# Patient Record
Sex: MALE | Race: OTHER | NOT HISPANIC OR LATINO
[De-identification: names, ages, dates, MRNs, and addresses within clinical notes are randomized per-mention and may not be internally consistent; named-entity substitution may affect disease eponyms.]

---

## 2023-05-26 PROBLEM — Z00.00 ENCOUNTER FOR PREVENTIVE HEALTH EXAMINATION: Status: ACTIVE | Noted: 2023-05-26

## 2023-05-30 ENCOUNTER — APPOINTMENT (OUTPATIENT)
Dept: RHEUMATOLOGY | Facility: CLINIC | Age: 38
End: 2023-05-30
Payer: COMMERCIAL

## 2023-05-30 ENCOUNTER — NON-APPOINTMENT (OUTPATIENT)
Age: 38
End: 2023-05-30

## 2023-05-30 VITALS
BODY MASS INDEX: 38.08 KG/M2 | WEIGHT: 266 LBS | DIASTOLIC BLOOD PRESSURE: 76 MMHG | HEIGHT: 70 IN | HEART RATE: 83 BPM | OXYGEN SATURATION: 96 % | SYSTOLIC BLOOD PRESSURE: 114 MMHG

## 2023-05-30 DIAGNOSIS — Z78.9 OTHER SPECIFIED HEALTH STATUS: ICD-10-CM

## 2023-05-30 PROCEDURE — 99204 OFFICE O/P NEW MOD 45 MIN: CPT

## 2023-05-30 NOTE — PHYSICAL EXAM
[General Appearance - Alert] : alert [General Appearance - In No Acute Distress] : in no acute distress [Sclera] : the sclera and conjunctiva were normal [PERRL With Normal Accommodation] : pupils were equal in size, round, and reactive to light [Extraocular Movements] : extraocular movements were intact [Outer Ear] : the ears and nose were normal in appearance [Oropharynx] : the oropharynx was normal [Neck Appearance] : the appearance of the neck was normal [Neck Cervical Mass (___cm)] : no neck mass was observed [Thyroid Diffuse Enlargement] : the thyroid was not enlarged [Thyroid Nodule] : there were no palpable thyroid nodules [Exaggerated Use Of Accessory Muscles For Inspiration] : no accessory muscle use [Abdomen Soft] : soft [Abdomen Tenderness] : non-tender [Abnormal Walk] : normal gait [Nail Clubbing] : no clubbing  or cyanosis of the fingernails [Musculoskeletal - Swelling] : no joint swelling seen [Motor Tone] : muscle strength and tone were normal [Skin Color & Pigmentation] : normal skin color and pigmentation [Skin Turgor] : normal skin turgor [] : no rash [Oriented To Time, Place, And Person] : oriented to person, place, and time [Impaired Insight] : insight and judgment were intact [Affect] : the affect was normal

## 2023-05-30 NOTE — HISTORY OF PRESENT ILLNESS
[FreeTextEntry1] : Patient reports symptoms of fevers up 103F for almost 2 weeks every day, fatigue, aches in legs, arms, back ,neck, vomiting unable to keep food and lost weight, night sweats, discomfort under his R rib. He also felt R swollen glands and trouble swallowing. He felt spontaneous sweating during the day profusely then it was stop. He didn't take any medications. This past week his fevers improved it was 98F in the morning to 100F and he's starting to feel better. He's eating and drinking normally. \par \par +Chronic dry eyes and dry mouth and he gets mouth ulcers "every once in a while"\par +Chronic knee and hand pains he attributes to [place] a  [place]. Denies joint swelling and he has occasional lower back stiffness depending on what he did the day before. 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 \par \par Denies rash, photosensitivity, raynaud's, dysphagia, skin thickening, nasal ulcers, hair loss, sinus problems, nosebleeds, visual disturbances, +dry eyes, +dry mouth, history of VTE, history of serositis.\par \par No history of autoimmune disease in family\par \par Labs:\par \par AMMON 1:40\par CRP 22.6\par Lyme Ab screen 1.44\par Western blot IgG + 5 reactive bands \par [id number]  alkaline phosphatase 306\par

## 2023-05-30 NOTE — ASSESSMENT
[FreeTextEntry1] : Fevers, body aches, vomiting, positive AMMON 1:40\par -Patient has no clear signs or symptoms to suggest an autoimmune disease. He does have fevers, and his AMMON is below threshold of 1:80 for classifying SLE. He also has elevated liver enzymes and no muscle symptoms of weakness. His Lyme antibody screen was positive and was + for 5 IgG bands on western blot, and no IgM bands.\par -Check AMMON, dsDNA, Chou, RNP, C3 ,C4, RF, CCP, ANCA, CPK, Aldolase, parvovirus, lyme disease, ferritin, IgA, IgM, IgG, scleroderma, sjogrens, hepatitis B serologies\par -F/u 2 weeks

## 2023-06-01 ENCOUNTER — EMERGENCY (EMERGENCY)
Facility: HOSPITAL | Age: 38
LOS: 1 days | Discharge: HOME | End: 2023-06-01
Attending: EMERGENCY MEDICINE
Payer: COMMERCIAL

## 2023-06-01 VITALS
OXYGEN SATURATION: 99 % | TEMPERATURE: 99 F | DIASTOLIC BLOOD PRESSURE: 82 MMHG | HEART RATE: 92 BPM | SYSTOLIC BLOOD PRESSURE: 115 MMHG | RESPIRATION RATE: 18 BRPM

## 2023-06-01 VITALS
HEIGHT: 70 IN | WEIGHT: 261.03 LBS | RESPIRATION RATE: 18 BRPM | DIASTOLIC BLOOD PRESSURE: 92 MMHG | HEART RATE: 91 BPM | OXYGEN SATURATION: 100 % | SYSTOLIC BLOOD PRESSURE: 133 MMHG | TEMPERATURE: 98 F

## 2023-06-01 LAB
ALBUMIN SERPL ELPH-MCNC: 4.4 G/DL — SIGNIFICANT CHANGE UP (ref 3.3–5)
ALP SERPL-CCNC: 145 U/L — HIGH (ref 40–120)
ALT FLD-CCNC: 184 U/L — HIGH (ref 10–45)
ANION GAP SERPL CALC-SCNC: 11 MMOL/L — SIGNIFICANT CHANGE UP (ref 5–17)
APPEARANCE UR: CLEAR — SIGNIFICANT CHANGE UP
APTT BLD: 35.3 SEC — SIGNIFICANT CHANGE UP (ref 27.5–35.5)
AST SERPL-CCNC: 83 U/L — HIGH (ref 10–40)
BACTERIA # UR AUTO: NEGATIVE — SIGNIFICANT CHANGE UP
BASOPHILS # BLD AUTO: 0.05 K/UL — SIGNIFICANT CHANGE UP (ref 0–0.2)
BASOPHILS NFR BLD AUTO: 0.9 % — SIGNIFICANT CHANGE UP (ref 0–2)
BILIRUB SERPL-MCNC: 0.7 MG/DL — SIGNIFICANT CHANGE UP (ref 0.2–1.2)
BILIRUB UR-MCNC: NEGATIVE — SIGNIFICANT CHANGE UP
BUN SERPL-MCNC: 13 MG/DL — SIGNIFICANT CHANGE UP (ref 7–23)
CALCIUM SERPL-MCNC: 9.4 MG/DL — SIGNIFICANT CHANGE UP (ref 8.4–10.5)
CHLORIDE SERPL-SCNC: 103 MMOL/L — SIGNIFICANT CHANGE UP (ref 96–108)
CO2 SERPL-SCNC: 25 MMOL/L — SIGNIFICANT CHANGE UP (ref 22–31)
COLOR SPEC: YELLOW — SIGNIFICANT CHANGE UP
CREAT SERPL-MCNC: 0.99 MG/DL — SIGNIFICANT CHANGE UP (ref 0.5–1.3)
DIFF PNL FLD: NEGATIVE — SIGNIFICANT CHANGE UP
EBV EA AB SER IA-ACNC: >150 U/ML — HIGH
EBV EA AB TITR SER IF: NEGATIVE — SIGNIFICANT CHANGE UP
EBV EA IGG SER-ACNC: POSITIVE
EBV NA IGG SER IA-ACNC: <3 U/ML — SIGNIFICANT CHANGE UP
EBV PATRN SPEC IB-IMP: SIGNIFICANT CHANGE UP
EBV VCA IGG AVIDITY SER QL IA: POSITIVE
EBV VCA IGM SER IA-ACNC: 82.3 U/ML — HIGH
EBV VCA IGM SER IA-ACNC: >160 U/ML — HIGH
EBV VCA IGM TITR FLD: POSITIVE
EGFR: 101 ML/MIN/1.73M2 — SIGNIFICANT CHANGE UP
EOSINOPHIL # BLD AUTO: 0.03 K/UL — SIGNIFICANT CHANGE UP (ref 0–0.5)
EOSINOPHIL NFR BLD AUTO: 0.6 % — SIGNIFICANT CHANGE UP (ref 0–6)
EPI CELLS # UR: 0 /HPF — SIGNIFICANT CHANGE UP
GLUCOSE SERPL-MCNC: 104 MG/DL — HIGH (ref 70–99)
GLUCOSE UR QL: NEGATIVE — SIGNIFICANT CHANGE UP
HCT VFR BLD CALC: 41.8 % — SIGNIFICANT CHANGE UP (ref 39–50)
HETEROPH AB TITR SER AGGL: POSITIVE
HGB BLD-MCNC: 13.9 G/DL — SIGNIFICANT CHANGE UP (ref 13–17)
HYALINE CASTS # UR AUTO: 1 /LPF — SIGNIFICANT CHANGE UP (ref 0–2)
IMM GRANULOCYTES NFR BLD AUTO: 0.4 % — SIGNIFICANT CHANGE UP (ref 0–0.9)
INR BLD: 1.04 RATIO — SIGNIFICANT CHANGE UP (ref 0.88–1.16)
KETONES UR-MCNC: NEGATIVE — SIGNIFICANT CHANGE UP
LEUKOCYTE ESTERASE UR-ACNC: NEGATIVE — SIGNIFICANT CHANGE UP
LYMPHOCYTES # BLD AUTO: 2.93 K/UL — SIGNIFICANT CHANGE UP (ref 1–3.3)
LYMPHOCYTES # BLD AUTO: 55.2 % — HIGH (ref 13–44)
MCHC RBC-ENTMCNC: 29.6 PG — SIGNIFICANT CHANGE UP (ref 27–34)
MCHC RBC-ENTMCNC: 33.3 GM/DL — SIGNIFICANT CHANGE UP (ref 32–36)
MCV RBC AUTO: 88.9 FL — SIGNIFICANT CHANGE UP (ref 80–100)
MONOCYTES # BLD AUTO: 0.49 K/UL — SIGNIFICANT CHANGE UP (ref 0–0.9)
MONOCYTES NFR BLD AUTO: 9.2 % — SIGNIFICANT CHANGE UP (ref 2–14)
NEUTROPHILS # BLD AUTO: 1.79 K/UL — LOW (ref 1.8–7.4)
NEUTROPHILS NFR BLD AUTO: 33.7 % — LOW (ref 43–77)
NITRITE UR-MCNC: NEGATIVE — SIGNIFICANT CHANGE UP
NRBC # BLD: 0 /100 WBCS — SIGNIFICANT CHANGE UP (ref 0–0)
PH UR: 5 — SIGNIFICANT CHANGE UP (ref 5–8)
PLATELET # BLD AUTO: 280 K/UL — SIGNIFICANT CHANGE UP (ref 150–400)
POTASSIUM SERPL-MCNC: 5 MMOL/L — SIGNIFICANT CHANGE UP (ref 3.5–5.3)
POTASSIUM SERPL-SCNC: 5 MMOL/L — SIGNIFICANT CHANGE UP (ref 3.5–5.3)
PROT SERPL-MCNC: 7.3 G/DL — SIGNIFICANT CHANGE UP (ref 6–8.3)
PROT UR-MCNC: NEGATIVE — SIGNIFICANT CHANGE UP
PROTHROM AB SERPL-ACNC: 12.1 SEC — SIGNIFICANT CHANGE UP (ref 10.5–13.4)
RBC # BLD: 4.7 M/UL — SIGNIFICANT CHANGE UP (ref 4.2–5.8)
RBC # FLD: 13.2 % — SIGNIFICANT CHANGE UP (ref 10.3–14.5)
RBC CASTS # UR COMP ASSIST: 0 /HPF — SIGNIFICANT CHANGE UP (ref 0–4)
SODIUM SERPL-SCNC: 139 MMOL/L — SIGNIFICANT CHANGE UP (ref 135–145)
SP GR SPEC: 1.02 — SIGNIFICANT CHANGE UP (ref 1.01–1.02)
UROBILINOGEN FLD QL: NEGATIVE — SIGNIFICANT CHANGE UP
WBC # BLD: 5.31 K/UL — SIGNIFICANT CHANGE UP (ref 3.8–10.5)
WBC # FLD AUTO: 5.31 K/UL — SIGNIFICANT CHANGE UP (ref 3.8–10.5)
WBC UR QL: 0 /HPF — SIGNIFICANT CHANGE UP (ref 0–5)

## 2023-06-01 PROCEDURE — 86664 EPSTEIN-BARR NUCLEAR ANTIGEN: CPT

## 2023-06-01 PROCEDURE — 81001 URINALYSIS AUTO W/SCOPE: CPT

## 2023-06-01 PROCEDURE — 87086 URINE CULTURE/COLONY COUNT: CPT

## 2023-06-01 PROCEDURE — 74177 CT ABD & PELVIS W/CONTRAST: CPT | Mod: MA

## 2023-06-01 PROCEDURE — 99284 EMERGENCY DEPT VISIT MOD MDM: CPT | Mod: 25

## 2023-06-01 PROCEDURE — 87799 DETECT AGENT NOS DNA QUANT: CPT

## 2023-06-01 PROCEDURE — 85025 COMPLETE CBC W/AUTO DIFF WBC: CPT

## 2023-06-01 PROCEDURE — 74177 CT ABD & PELVIS W/CONTRAST: CPT | Mod: 26,MA

## 2023-06-01 PROCEDURE — 85730 THROMBOPLASTIN TIME PARTIAL: CPT

## 2023-06-01 PROCEDURE — 86663 EPSTEIN-BARR ANTIBODY: CPT

## 2023-06-01 PROCEDURE — 86308 HETEROPHILE ANTIBODY SCREEN: CPT

## 2023-06-01 PROCEDURE — 80053 COMPREHEN METABOLIC PANEL: CPT

## 2023-06-01 PROCEDURE — 86665 EPSTEIN-BARR CAPSID VCA: CPT

## 2023-06-01 PROCEDURE — 85610 PROTHROMBIN TIME: CPT

## 2023-06-01 PROCEDURE — 99284 EMERGENCY DEPT VISIT MOD MDM: CPT

## 2023-06-01 NOTE — ED PROVIDER NOTE - DIFFERENTIAL DIAGNOSIS
Ddx includes, however, is not limited to: EBV, fatty liver, diverticulitis, viral syndrome/sequelae, other Differential Diagnosis

## 2023-06-01 NOTE — ED PROVIDER NOTE - CLINICAL SUMMARY MEDICAL DECISION MAKING FREE TEXT BOX
BERNA Covarrubias MD: 37M with previous h/o lyme disease, recent febrile illness 2 weeks ago with n/v/d, now with abd pain, splenomegaly, elevated LFTs. Possible EBV, fatty liver, diverticulitis, other. Plan: basic labs, EBV titers/monospot, CTAP, reassess for con't outpt f/u if w/u unrevealing or shows mono

## 2023-06-01 NOTE — ED PROVIDER NOTE - PROGRESS NOTE DETAILS
BERNA Covarrubias MD: Results d/w patient. LFTs have gone down since last check. Pt feels well clinically, feels comfortable going home and following up outpt. Pt to make appts with GI and hepatology. Explained EBV precautions with family at home and pt understands that results of titers are pending and that he should receive a call with results by tomorrow. Pt knows to avoid contact sports and any activity that may cause trauma to abdomen given enlarged spleen. Printed copies of results provided to pt. Return precautions given to pt. Knows to f/u with PMD in 1 week and to return to the ED sooner for any worsening/concerning sx.

## 2023-06-01 NOTE — ED ADULT NURSE NOTE - NSFALLUNIVINTERV_ED_ALL_ED
Bed/Stretcher in lowest position, wheels locked, appropriate side rails in place/Call bell, personal items and telephone in reach/Instruct patient to call for assistance before getting out of bed/chair/stretcher/Non-slip footwear applied when patient is off stretcher/[place] to call system/Physically safe environment - no spills, clutter or unnecessary equipment/Purposeful proactive rounding/Room/bathroom lighting operational, light cord in reach

## 2023-06-01 NOTE — ED PROVIDER NOTE - PATIENT PORTAL LINK FT
You can access the FollowMyHealth Patient Portal offered by [facility] by registering at the following website: [email or web address]. By joining [facility]’s FollowMyHealth portal, you will also be able to view your health information using other applications (apps) compatible with our system.

## 2023-06-01 NOTE — ED PROVIDER NOTE - NSFOLLOWUPINSTRUCTIONS_ED_ALL_ED_FT
Schedule follow-up appointments with Gastroenterology and Hematology as planned.    Avoid all contact sports and any activity that can lead to abdominal trauma until you are cleared by Gastroenterology.    Use medications sparingly, however,   You may take 500-1000 mg acetaminophen every 6 hours, as needed for pain  You may take 600 mg ibuprofen every 8 hours, with food, as needed for pain     Copies of your results today were provided to you. Your Mononucleosis (Se Barr Virus) test results are still pending. You can call tomorrow after 11am to receive results.    Follow-up with your primary care provided within 1 week. Return to the ER sooner for: fevers, abdominal pain, fainting, dizziness, chest pain, shortness of breath, skin turning yellow, or anything else of concern to you.    Abdominal Pain, Adult  Pain in the abdomen (abdominal pain) can be caused by many things. Often, abdominal pain is not serious and it gets better with no treatment or by being treated at home. However, sometimes abdominal pain is serious.    Your health care provider will ask questions about your medical history and do a physical exam to try to determine the cause of your abdominal pain.    Follow these instructions at home:  Medicines    Take over-the-counter and prescription medicines only as told by your health care provider.  Do not take a laxative unless told by your health care provider.  General instructions      Watch your condition for any changes.  Drink enough fluid to keep your urine pale yellow.  Keep all follow-up visits as told by your health care provider. This is important.  Contact a health care provider if:  Your abdominal pain changes or gets worse.  You are not hungry or you lose weight without trying.  You are constipated or have diarrhea for more than 2–3 days.  You have pain when you urinate or have a bowel movement.  Your abdominal pain wakes you up at night.  Your pain gets worse with meals, after eating, or with certain foods.  You are vomiting and cannot keep anything down.  You have a fever.  You have blood in your urine.  Get help right away if:  Your pain does not go away as soon as your health care provider told you to expect.  You cannot stop vomiting.  Your pain is only in areas of the abdomen, such as the right side or the left lower portion of the abdomen. Pain on the right side could be caused by appendicitis.  You have bloody or black stools, or stools that look like tar.  You have severe pain, cramping, or bloating in your abdomen.  You have signs of dehydration, such as:  Dark urine, very little urine, or no urine.  Cracked lips.  Dry mouth.  Sunken eyes.  Sleepiness.  Weakness.  You have trouble breathing or chest pain.  Summary  Often, abdominal pain is not serious and it gets better with no treatment or by being treated at home. However, sometimes abdominal pain is serious.  Watch your condition for any changes.  Take over-the-counter and prescription medicines only as told by your health care provider.  Contact a health care provider if your abdominal pain changes or gets worse.  Get help right away if you have severe pain, cramping, or bloating in your abdomen.  This information is not intended to replace advice given to you by your health care provider. Make sure you discuss any questions you have with your health care provider.    Enlarged Spleen  A person's spleen and surrounding internal organs.  An enlarged spleen (splenomegaly) is when the spleen is larger than normal. The spleen is an organ that is located in the upper left area of the abdomen, just under the ribs. The spleen is like a storage unit for red blood cells, and it also works to filter and clean the blood. It destroys cells that are damaged or worn out. The spleen is also important for fighting disease.    This condition is usually noticed when the spleen is almost twice its normal size. An enlarged spleen is usually a sign of another health problem.    What are the causes?  This condition may be caused by:  Mononucleosis and other viral infections.  Infection with certain bacteria or parasites.  Liver failure and other liver diseases.  Blood diseases, such as hemolytic anemia.  Blood cancers, such as leukemia or Hodgkin's disease.  Other causes include:  Tumors and fluid-filled sacs (cysts).  Metabolic disorders, such as Gaucher disease or Ramonita–Pick disease.  Pressure or blood clots in the veins of the spleen.  Connective tissue disorders, such as lupus or rheumatoid arthritis.  What are the signs or symptoms?  Symptoms of this condition include:  Pain in the upper left part of the abdomen. The pain may spread to the left shoulder or get worse when you take a breath.  Feeling full without eating or after eating only a small amount.  Feeling tired.  Chronic infections.  Bleeding or bruising easily.  In some cases, there are no symptoms.    How is this diagnosed?  This condition is diagnosed based on:  A physical exam. Your health care provider will feel the left upper part of your abdomen.  Tests, such as:  Blood tests to check red and white blood cells and other proteins and enzymes.  A biopsy. During a biopsy, a tissue sample of the liver or bone marrow may be removed and looked at under a microscope. A biopsy may be done if there is concern that the liver or bone marrow is causing the enlarged spleen.  An abdominal ultrasound.  A CT scan.  An MRI.  How is this treated?  Treatment for this condition depends on the cause. Treatment aims to:  Manage the conditions that cause enlargement of the spleen.  Reduce the size of the spleen.  Treatment may include:  Medicines to treat infection or disease.  Radiation therapy.  Blood transfusions.  If these treatments do not help or if the cause cannot be found, surgery to remove the spleen (splenectomy) may be recommended. After surgery, you may need to have vaccinations or take antibiotics to prevent infections.    Follow these instructions at home:  Medicines    Take over-the-counter and prescription medicines only as told by your health care provider.  If you were prescribed an antibiotic medicine, take it as told by your health care provider. Do not stop taking the antibiotic even if you start to feel better.  Talk with your health care provider about whether you need vaccinations to help prevent infections. This may be needed if treatment included surgery to remove the spleen. Not having a spleen makes certain infections more dangerous because it weakens the body's disease-fighting system (immune system).  General instructions    Follow instructions from your health care provider about limiting your activities. To avoid injury or a ruptured spleen, make sure you:  Avoid contact sports.  Wear a seat belt in the car.  Keep all follow-up visits as told by your health care provider. This is important.  Contact a health care provider if:  Your symptoms do not improve as expected.  You have a fever or chills.  You feel generally ill.  You have increased pain when you take in a breath.  Get help right away if you:  Experience an injury or impact to the spleen area.  Have abdominal pain that becomes severe.  Feel dizzy or you faint.  Feel very weak.  Have cold and clammy skin.  Have sweating for no reason.  Have chest pain or difficulty breathing.  Summary  An enlarged spleen (splenomegaly) is when the spleen is larger than normal.  An enlarged spleen is usually a sign of another health problem.  This condition is treated with medicines, radiation therapy, blood transfusions, vaccines, or surgery.  This information is not intended to replace advice given to you by your health care provider. Make sure you discuss any questions you have with your health care provider.

## 2023-06-01 NOTE — ED PROVIDER NOTE - OBJECTIVE STATEMENT
Pt is Pt is a 36 y/o male with previous h/o lyme disease (as a teenager, treated), who presents with abdominal pain. Pt reports that 2 weeks ago he had a febrile illness with associated nausea and vomiting. Last fever 6 days ago. Since then, pt followed up with PCP who sent him to rheumatology for mildly elevated AMMON. Pt saw Rheum 2 days ago and was told that he likely does not have an autoimmune d/o, however. Recent labs show elevated LFTs to 300's and on exam showed splenomegaly. Pt performed abd US showing fatty liver and pt was told to come to ED for abd pain 2/2 enlarged spleen. Pt returns today for abd discomfort. Reports AP since last night to L side of abdomen, upper>lower. No f/c. Denies n/v. +loose stool yesterday. No recent travel. No known sick contacts.

## 2023-06-01 NOTE — ED PROVIDER NOTE - OTHER RECORDS SUMMARY FREE TEXT FOR MDM OBTAINED AND REVIEWED OLD RECORDS QUESTION
reviewed outpt Rheum note from 5/30/23 and recent outpt labs from [facility] showing LFTs in 300's, Lyme titer + for IgG - for IgM

## 2023-06-02 LAB
CULTURE RESULTS: NO GROWTH — SIGNIFICANT CHANGE UP
EBV DNA SERPL NAA+PROBE-ACNC: SIGNIFICANT CHANGE UP IU/ML
EBVPCR LOG: SIGNIFICANT CHANGE UP LOG10IU/ML
SPECIMEN SOURCE: SIGNIFICANT CHANGE UP

## 2023-06-02 NOTE — ED POST DISCHARGE NOTE - DETAILS
6/2/23:  pt called for results, informed of results, pt wife pregnant and will have outpt testing as well. all questions answered, advised supportive care and avoidance of contact sports for 1 month, f/u PCP. -Jose De Jesus Hayes PA-C

## 2023-06-13 ENCOUNTER — APPOINTMENT (OUTPATIENT)
Dept: RHEUMATOLOGY | Facility: CLINIC | Age: 38
End: 2023-06-13
Payer: COMMERCIAL

## 2023-06-13 VITALS
OXYGEN SATURATION: 99 % | SYSTOLIC BLOOD PRESSURE: 112 MMHG | HEIGHT: 70 IN | HEART RATE: 90 BPM | WEIGHT: 260 LBS | BODY MASS INDEX: 37.22 KG/M2 | DIASTOLIC BLOOD PRESSURE: 70 MMHG

## 2023-06-13 DIAGNOSIS — R76.8 OTHER SPECIFIED ABNORMAL IMMUNOLOGICAL FINDINGS IN SERUM: ICD-10-CM

## 2023-06-13 PROCEDURE — 99214 OFFICE O/P EST MOD 30 MIN: CPT

## 2023-06-13 NOTE — HISTORY OF PRESENT ILLNESS
[FreeTextEntry1] : 6/13/23:\par Pt has an abdominal ultrasound showing splenomegaly and went to the ED at [facility] for abdominal pain where they tested him for EBV and he was positive. Today he reports improvement in symptoms. Still has body aches but thinks maybe his job as a  is contributing? Fatigue is less. Labs showed elevated ferritin, lyme Ab + with western blot IgG+, C4 elevated, IgA and aldolase elevated. He is following up with GI and Endo\par \par \par \par Initial visit:\par Patient reports symptoms of fevers up 103F for almost 2 weeks every day, fatigue, aches in legs, arms, back ,neck, vomiting unable to keep food and lost weight, night sweats, discomfort under his R rib. He also felt R swollen glands and trouble swallowing. He felt spontaneous sweating during the day profusely then it was stop. He didn't take any medications. This past week his fevers improved it was 98F in the morning to 100F and he's starting to feel better. He's eating and drinking normally. \par \par +Chronic dry eyes and dry mouth and he gets mouth ulcers "every once in a while"\par +Chronic knee and hand pains he attributes to his work [place] a  [place] city. Denies joint swelling and he has occasional lower back stiffness depending on what he did the day before. 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 \par \par Denies rash, photosensitivity, raynaud's, dysphagia, skin thickening, nasal ulcers, hair loss, sinus problems, nosebleeds, visual disturbances, +dry eyes, +dry mouth, history of VTE, history of serositis.\par \par No history of autoimmune disease in family\par \par Labs:\par \par AMMON 1:40\par CRP 22.6\par Lyme Ab screen 1.44\par Western blot IgG + 5 reactive bands \par [id number]  alkaline phosphatase 306\par

## 2023-06-13 NOTE — PHYSICAL EXAM
[General Appearance - Alert] : alert [General Appearance - In No Acute Distress] : in no acute distress [Sclera] : the sclera and conjunctiva were normal [PERRL With Normal Accommodation] : pupils were equal in size, round, and reactive to light [Extraocular Movements] : extraocular movements were intact [Oropharynx] : the oropharynx was normal [Outer Ear] : the ears and nose were normal in appearance [Neck Appearance] : the appearance of the neck was normal [Neck Cervical Mass (___cm)] : no neck mass was observed [Thyroid Diffuse Enlargement] : the thyroid was not enlarged [Thyroid Nodule] : there were no palpable thyroid nodules [Exaggerated Use Of Accessory Muscles For Inspiration] : no accessory muscle use [Abdomen Soft] : soft [Abdomen Tenderness] : non-tender [Abnormal Walk] : normal gait [Nail Clubbing] : no clubbing  or cyanosis of the fingernails [Motor Tone] : muscle strength and tone were normal [Musculoskeletal - Swelling] : no joint swelling seen [Skin Color & Pigmentation] : normal skin color and pigmentation [Skin Turgor] : normal skin turgor [] : no rash [Oriented To Time, Place, And Person] : oriented to person, place, and time [Impaired Insight] : insight and judgment were intact [Affect] : the affect was normal

## 2023-06-13 NOTE — ASSESSMENT
[FreeTextEntry1] : Fevers, body aches, vomiting, positive AMMON 1:40\par -Patient has no clear signs or symptoms to suggest an autoimmune disease\par -Lab showed negative AMMON, aldolase 8.9, IgA 341, parvovirus IgG 6.3, C4 55, ferritin 905, Lyme Ab+ with 5 bangs of IgG + on western blot, otherwise negative for autoimmune disease. \par -he was diagnosed with Mono with EBV+, splenomegaly and elevated LFTs; improving symptomatically\par -No signs of autoimmune disease on labs\par -F/u 2-3 months to repeat abnormal labs

## 2023-08-29 ENCOUNTER — APPOINTMENT (OUTPATIENT)
Dept: RHEUMATOLOGY | Facility: CLINIC | Age: 38
End: 2023-08-29

## 2023-09-14 ENCOUNTER — APPOINTMENT (OUTPATIENT)
Dept: RHEUMATOLOGY | Facility: CLINIC | Age: 38
End: 2023-09-14

## 2024-05-06 NOTE — ED ADULT NURSE NOTE - NSFALLRISK_ED_ALL_ED
The patient is Stable - Low risk of patient condition declining or worsening    Shift Goals  Clinical Goals: CIWA, monitor VS  Patient Goals: Detox, comfort    Progress made toward(s) clinical / shift goals:    Problem: Risk for Aspiration  Goal: Patient's risk for aspiration will be absent or decrease  Outcome: Progressing     Problem: Fall Risk  Goal: Patient will remain free from falls  Outcome: Progressing   No

## 2024-07-09 ENCOUNTER — APPOINTMENT (OUTPATIENT)
Dept: RHEUMATOLOGY | Facility: CLINIC | Age: 39
End: 2024-07-09